# Patient Record
Sex: MALE | Race: WHITE | NOT HISPANIC OR LATINO
[De-identification: names, ages, dates, MRNs, and addresses within clinical notes are randomized per-mention and may not be internally consistent; named-entity substitution may affect disease eponyms.]

---

## 2020-01-13 PROBLEM — Z00.00 ENCOUNTER FOR PREVENTIVE HEALTH EXAMINATION: Status: ACTIVE | Noted: 2020-01-13

## 2020-02-04 ENCOUNTER — APPOINTMENT (OUTPATIENT)
Dept: HEMATOLOGY ONCOLOGY | Facility: CLINIC | Age: 60
End: 2020-02-04
Payer: COMMERCIAL

## 2020-02-04 VITALS
RESPIRATION RATE: 18 BRPM | BODY MASS INDEX: 26.96 KG/M2 | OXYGEN SATURATION: 96 % | HEIGHT: 69 IN | HEART RATE: 93 BPM | DIASTOLIC BLOOD PRESSURE: 76 MMHG | SYSTOLIC BLOOD PRESSURE: 118 MMHG | TEMPERATURE: 98.7 F | WEIGHT: 182 LBS

## 2020-02-04 DIAGNOSIS — F17.200 NICOTINE DEPENDENCE, UNSPECIFIED, UNCOMPLICATED: ICD-10-CM

## 2020-02-04 DIAGNOSIS — Z86.79 PERSONAL HISTORY OF OTHER DISEASES OF THE CIRCULATORY SYSTEM: ICD-10-CM

## 2020-02-04 DIAGNOSIS — Z80.7 FAMILY HISTORY OF OTHER MALIGNANT NEOPLASMS OF LYMPHOID, HEMATOPOIETIC AND RELATED TISSUES: ICD-10-CM

## 2020-02-04 PROCEDURE — 99205 OFFICE O/P NEW HI 60 MIN: CPT

## 2020-02-04 NOTE — REVIEW OF SYSTEMS
[Patient Intake Form Reviewed] : Patient intake form was reviewed [Recent Change In Weight] : ~T recent weight change [Abdominal Pain] : abdominal pain [Joint Pain] : joint pain [Joint Stiffness] : joint stiffness [Anxiety] : anxiety [Depression] : depression [Negative] : Allergic/Immunologic [FreeTextEntry7] : right Sided [FreeTextEntry9] : LBP

## 2020-02-04 NOTE — ASSESSMENT
[FreeTextEntry1] : This is a very pleasant 59-year-old gentleman who has recently been diagnosed with a biopsy-proven pancreatic adenocarcinoma. His CAT scan and MRI findings, which I reviewed by telephone with Dr. Singer of [facility] radiology, are suggestive of metastatic hepatic involvement.  I will therefore obtain a PET scan to see if we can further confirm this as well as if there is additional metastatic disease noted may have not been apparent on the CAT scan. I have requested the pathology department perform MMR/MSI testing as well as send a sample for NGS testing.  If there is not a sufficient sample, he may require an additional biopsy at which point I would favor a liver biopsy to also confirm metastatic disease.  Given these findings, I suspect that systemic chemotherapy would be his primary management option.   Due to his younger age I would favor FOLFIRINOX chemotherapy. However, his current poor performance status may make it somewhat more difficult for him to tolerate.  A reasonable second option may be the combination of Gemzar with Abraxane.   I have discussed genetic counseling with the patient but he wished to hold off on this.  Smoking cessation was strongly urged him and he was provided a plan at today's office visit.  \par \par Thank you very much for allowing me to participate in this gentleman's medical care. Should you have any questions or concerns please do not hesitate to call me directly.

## 2020-02-04 NOTE — PHYSICAL EXAM
[Ambulatory and capable of all self care but unable to carry out any work activities] : Status 2- Ambulatory and capable of all self care but unable to carry out any work activities. Up and about more than 50% of waking hours [Normal] : affect appropriate [de-identified] : Discomfort in the right upper quadrant during palpation

## 2020-02-04 NOTE — HISTORY OF PRESENT ILLNESS
[de-identified] : This is a very pleasant 59-year-old gentleman with the below past medical history who presented to [facility] on 12/21/19 for jaundice. Subsequently he was found to have a pancreatic head mass small liver lesions suggestive of metastatic disease and gallstones on CAT scan. He was found to have an obstructive jaundice with elevated LFTs and bilirubin. He underwent an MRI at the time of admission showing mildly dilated pancreatic duct is in the region of the tail and the body also noted was a 10 x 7 mm a hypoenhancing lesion in the pancreatic body and narrowing of the portal vein. An EUS/ERCP showed a mass in the head of the pancreas with invasion into the portal vein stent was placed and biopsies performed at that time. Pathology from this revealed no malignant cells. His CA 19-9 level at that time was 4986. He underwent a repeat EUS with biopsies on 1/27/2020 that confirmed the presence of malignant cells favoring pancreatic adenocarcinoma. He is now here for further recommendations and treatment planning.   [0 - No Distress] : Distress Level: 0

## 2020-02-24 ENCOUNTER — APPOINTMENT (OUTPATIENT)
Dept: HEMATOLOGY ONCOLOGY | Facility: CLINIC | Age: 60
End: 2020-02-24
Payer: COMMERCIAL

## 2020-02-24 VITALS
SYSTOLIC BLOOD PRESSURE: 128 MMHG | TEMPERATURE: 98.3 F | HEART RATE: 88 BPM | WEIGHT: 179 LBS | HEIGHT: 69 IN | DIASTOLIC BLOOD PRESSURE: 83 MMHG | OXYGEN SATURATION: 97 % | BODY MASS INDEX: 26.51 KG/M2 | RESPIRATION RATE: 20 BRPM

## 2020-02-24 PROCEDURE — 99215 OFFICE O/P EST HI 40 MIN: CPT

## 2020-02-24 RX ORDER — EZETIMIBE 10 MG/1
TABLET ORAL
Refills: 0 | Status: ACTIVE | COMMUNITY

## 2020-02-24 RX ORDER — CLOPIDOGREL 75 MG/1
75 TABLET, FILM COATED ORAL
Refills: 0 | Status: ACTIVE | COMMUNITY

## 2020-02-24 RX ORDER — OXYCODONE 10 MG/1
10 TABLET ORAL
Refills: 0 | Status: ACTIVE | COMMUNITY

## 2020-02-24 RX ORDER — DULOXETINE HYDROCHLORIDE 60 MG/1
60 CAPSULE, DELAYED RELEASE ORAL
Refills: 0 | Status: ACTIVE | COMMUNITY

## 2020-02-24 RX ORDER — KRILL/OM-3/DHA/EPA/PHOSPHO/AST 1000-230MG
CAPSULE ORAL
Refills: 0 | Status: ACTIVE | COMMUNITY

## 2020-02-24 NOTE — ASSESSMENT
[FreeTextEntry1] : This is a very pleasant 59-year-old gentleman who has recently been diagnosed with a biopsy-proven pancreatic adenocarcinoma. His CAT scan and MRI findings, which I reviewed by telephone with Dr. Singer of [facility] radiology, are suggestive of metastatic hepatic involvement.  I obtained a PET scan on 2/21/20 which unfortunately did confirm this was metastatic disease.  I have requested the pathology department perform MMR/MSI testing as well as send a sample for NGS testing.  This revealed that the MMR proteins were intact and that there was a KRAS and TP53 mutation present. At this time, unfortunately, these are not actionable.  Given these findings, systemic chemotherapy would be his primary management option.   Due to his younger age I would favor FOLFIRINOX chemotherapy. However, his current poor performance status may make it somewhat more difficult for him to tolerate.  A reasonable second option may be the combination of Gemzar with Abraxane.   I have discussed genetic counseling with the patient but he wished to hold off on this.  Smoking cessation was strongly urged him and he was provided a plan at today's office visit.   Arrangements will be made for him to have a port placed to start chemotherapy soon as possible.  He was instructed to stop his aspirin and Plavix today for port placement.  Case was discussed with Dr. Colvin who will expedite the process.  I will also again set up a visit with Dr. Renee for palliative care and pain management as he may benefit from a celiac plexus block.  The patient will return in one week for followup and to hopefully initiate chemotherapy.

## 2020-02-24 NOTE — PHYSICAL EXAM
[Ambulatory and capable of all self care but unable to carry out any work activities] : Status 2- Ambulatory and capable of all self care but unable to carry out any work activities. Up and about more than 50% of waking hours [Normal] : affect appropriate [de-identified] : Discomfort in the right upper quadrant during palpation

## 2020-02-24 NOTE — HISTORY OF PRESENT ILLNESS
[0 - No Distress] : Distress Level: 0 [de-identified] : This is a very pleasant 59-year-old gentleman with the below past medical history who presented to [facility] on 12/21/19 for jaundice. Subsequently he was found to have a pancreatic head mass small liver lesions suggestive of metastatic disease and gallstones on CAT scan. He was found to have an obstructive jaundice with elevated LFTs and bilirubin. He underwent an MRI at the time of admission showing mildly dilated pancreatic duct is in the region of the tail and the body also noted was a 10 x 7 mm a hypoenhancing lesion in the pancreatic body and narrowing of the portal vein. An EUS/ERCP showed a mass in the head of the pancreas with invasion into the portal vein stent was placed and biopsies performed at that time. Pathology from this revealed no malignant cells. His CA 19-9 level at that time was 4986. He underwent a repeat EUS with biopsies on 1/27/2020 that confirmed the presence of malignant cells favoring pancreatic adenocarcinoma. He is now here for further recommendations and treatment planning.   [de-identified] : Here today for a follow up visit, pancreatic cancer. He complains of pain to his abdomen that wraps arount to his left side 10/10. Pain is increased after meals and at night inhibiting sleep. Pt will reach out to Dr. Renee who's office has tried to contact him.  Poor appetite, 3 lb weight loss since last visit. Pt cancelled last follow up visit stating that he had not completed ordered scans. He also rescheduled his PET scan to 2/21/20 from the prior scheduled appointment 2 weeks earlier.  Importance of compliance with treatment plan reinforced.

## 2020-02-24 NOTE — REASON FOR VISIT
[Initial Consultation] : an initial consultation [Follow-Up Visit] : a follow-up [FreeTextEntry2] : Pancreatic cancer.

## 2020-02-24 NOTE — REVIEW OF SYSTEMS
[Patient Intake Form Reviewed] : Patient intake form was reviewed [Abdominal Pain] : abdominal pain [Recent Change In Weight] : ~T recent weight change [Anxiety] : anxiety [Fatigue] : fatigue [SOB on Exertion] : shortness of breath during exertion [Negative] : Musculoskeletal [FreeTextEntry9] : LBP wraps around to left side, oxycodone

## 2020-02-27 RX ORDER — ONDANSETRON 8 MG/1
8 TABLET ORAL EVERY 8 HOURS
Qty: 45 | Refills: 0 | Status: ACTIVE | COMMUNITY
Start: 2020-02-27 | End: 1900-01-01

## 2020-02-27 RX ORDER — LIDOCAINE AND PRILOCAINE 25; 25 MG/G; MG/G
2.5-2.5 CREAM TOPICAL
Qty: 2 | Refills: 2 | Status: ACTIVE | COMMUNITY
Start: 2020-02-27 | End: 1900-01-01

## 2020-02-27 RX ORDER — PROCHLORPERAZINE MALEATE 10 MG/1
10 TABLET ORAL EVERY 8 HOURS
Qty: 90 | Refills: 2 | Status: ACTIVE | COMMUNITY
Start: 2020-02-27 | End: 1900-01-01

## 2020-03-02 ENCOUNTER — APPOINTMENT (OUTPATIENT)
Dept: HEMATOLOGY ONCOLOGY | Facility: CLINIC | Age: 60
End: 2020-03-02
Payer: COMMERCIAL

## 2020-03-02 VITALS
HEART RATE: 82 BPM | BODY MASS INDEX: 26.22 KG/M2 | OXYGEN SATURATION: 95 % | HEIGHT: 69 IN | DIASTOLIC BLOOD PRESSURE: 73 MMHG | RESPIRATION RATE: 18 BRPM | TEMPERATURE: 98.3 F | WEIGHT: 177 LBS | SYSTOLIC BLOOD PRESSURE: 109 MMHG

## 2020-03-02 PROCEDURE — 99214 OFFICE O/P EST MOD 30 MIN: CPT

## 2020-03-02 NOTE — HISTORY OF PRESENT ILLNESS
[0 - No Distress] : Distress Level: 0 [de-identified] : This is a very pleasant 59-year-old gentleman with the below past medical history who presented to [facility] on 12/21/19 for jaundice. Subsequently he was found to have a pancreatic head mass small liver lesions suggestive of metastatic disease and gallstones on CAT scan. He was found to have an obstructive jaundice with elevated LFTs and bilirubin. He underwent an MRI at the time of admission showing mildly dilated pancreatic duct is in the region of the tail and the body also noted was a 10 x 7 mm a hypoenhancing lesion in the pancreatic body and narrowing of the portal vein. An EUS/ERCP showed a mass in the head of the pancreas with invasion into the portal vein stent was placed and biopsies performed at that time. Pathology from this revealed no malignant cells. His CA 19-9 level at that time was 4986. He underwent a repeat EUS with biopsies on 1/27/2020 that confirmed the presence of malignant cells favoring pancreatic adenocarcinoma. He is now here for further recommendations and treatment planning.   [de-identified] : Here today for a follow up visit, pancreatic cancer in the company of his wife. Back pain continues. He has yet to schedule an appointment with Dr. Renee. Complains of constipation and gas.

## 2020-03-02 NOTE — PHYSICAL EXAM
[Ambulatory and capable of all self care but unable to carry out any work activities] : Status 2- Ambulatory and capable of all self care but unable to carry out any work activities. Up and about more than 50% of waking hours [Normal] : affect appropriate [de-identified] : Discomfort in the right upper quadrant during palpation

## 2020-03-02 NOTE — REASON FOR VISIT
[Follow-Up Visit] : a follow-up [Initial Consultation] : an initial consultation [FreeTextEntry2] : Pancreatic cancer.

## 2020-03-02 NOTE — REVIEW OF SYSTEMS
[Patient Intake Form Reviewed] : Patient intake form was reviewed [Fatigue] : fatigue [SOB on Exertion] : shortness of breath during exertion [Abdominal Pain] : abdominal pain [Anxiety] : anxiety [Joint Pain] : joint pain [FreeTextEntry7] : constipation, mirilax prn. Pain following meals.  [FreeTextEntry8] : frequency [Negative] : Constitutional [FreeTextEntry9] : LBP wraps around to left side continues

## 2020-03-02 NOTE — ASSESSMENT
[FreeTextEntry1] : This is a very pleasant 59-year-old gentleman who has recently been diagnosed with a biopsy-proven pancreatic adenocarcinoma. His CAT scan and MRI findings, which I reviewed by telephone with Dr. Singer of [facility] radiology, are suggestive of metastatic hepatic involvement.  I obtained a PET scan on 2/21/20 which unfortunately did confirm this was metastatic disease.  I have requested the pathology department perform MMR/MSI testing as well as send a sample for NGS testing.  This revealed that the MMR proteins were intact and that there was a KRAS and TP53 mutation present. At this time, unfortunately, these are not actionable.  Given these findings, systemic chemotherapy would be his primary management option.  There is a first line trial available looking at the combination of Gemzar and Abraxane +/- Zolbetuximab in Claudin 18.2 positive metastatic pancreatic adenocarcinoma available at [facility].  The research coordinator and I have both tried to reach him regarding this trial since 2/25/2020 without a return call.  I have discussed this option with he and his wife and he would like to think about it. I urged him to do this as soon as possible as if he is interested we will likely need a liver biopsy so that additional IHC staining can be performed for this on a metastatic site.  If not, then we will proceed with standard of care systemic chemotherapy. Insurance authorization has been requested. He has also yet to schedule an appointment with Dr. Renee of palliative care, the importance of which is again stress he and his wife. I have offered him assistance in accomplishing this, however, he continues to wish to do this on his own.   Due to his younger age I would favor FOLFIRINOX chemotherapy. However, his current poor performance status may make it somewhat more difficult for him to tolerate.  A reasonable second option may be the combination of Gemzar with Abraxane.   I have discussed genetic counseling with the patient but he wished to hold off on this.  Smoking cessation was strongly urged him and he was provided a plan at today's office visit.  The patient will return in one week for followup and to hopefully initiate chemotherapy or earlier if he decides against protocol treatment.

## 2020-03-05 RX ORDER — PANTOPRAZOLE 20 MG/1
20 TABLET, DELAYED RELEASE ORAL DAILY
Qty: 30 | Refills: 0 | Status: ACTIVE | COMMUNITY
Start: 2020-03-05 | End: 1900-01-01

## 2020-03-09 ENCOUNTER — APPOINTMENT (OUTPATIENT)
Dept: HEMATOLOGY ONCOLOGY | Facility: CLINIC | Age: 60
End: 2020-03-09
Payer: COMMERCIAL

## 2020-03-11 ENCOUNTER — APPOINTMENT (OUTPATIENT)
Dept: HEMATOLOGY ONCOLOGY | Facility: CLINIC | Age: 60
End: 2020-03-11
Payer: COMMERCIAL

## 2020-03-11 VITALS
RESPIRATION RATE: 18 BRPM | HEIGHT: 69 IN | TEMPERATURE: 98.3 F | HEART RATE: 103 BPM | OXYGEN SATURATION: 98 % | BODY MASS INDEX: 25.18 KG/M2 | DIASTOLIC BLOOD PRESSURE: 70 MMHG | WEIGHT: 170 LBS | SYSTOLIC BLOOD PRESSURE: 111 MMHG

## 2020-03-11 DIAGNOSIS — G89.3 NEOPLASM RELATED PAIN (ACUTE) (CHRONIC): ICD-10-CM

## 2020-03-11 DIAGNOSIS — C25.9 MALIGNANT NEOPLASM OF PANCREAS, UNSPECIFIED: ICD-10-CM

## 2020-03-11 PROCEDURE — 99214 OFFICE O/P EST MOD 30 MIN: CPT

## 2020-03-13 ENCOUNTER — APPOINTMENT (OUTPATIENT)
Dept: GERIATRICS | Facility: CLINIC | Age: 60
End: 2020-03-13

## 2020-03-16 NOTE — HISTORY OF PRESENT ILLNESS
[de-identified] : This is a very pleasant 59-year-old gentleman with the below past medical history who presented to [facility] on 12/21/19 for jaundice. Subsequently he was found to have a pancreatic head mass small liver lesions suggestive of metastatic disease and gallstones on CAT scan. He was found to have an obstructive jaundice with elevated LFTs and bilirubin. He underwent an MRI at the time of admission showing mildly dilated pancreatic duct is in the region of the tail and the body also noted was a 10 x 7 mm a hypoenhancing lesion in the pancreatic body and narrowing of the portal vein. An EUS/ERCP showed a mass in the head of the pancreas with invasion into the portal vein stent was placed and biopsies performed at that time. Pathology from this revealed no malignant cells. His CA 19-9 level at that time was 4986. He underwent a repeat EUS with biopsies on 1/27/2020 that confirmed the presence of malignant cells favoring pancreatic adenocarcinoma. He is now here for f/u with labs.   [de-identified] : Here today for a follow up visit, pancreatic cancer. Complaints of severe abdominal pain, poor fluid/food intake, dizziness, painful gas and fatigue. Pain is especially worse after eating. He will see Dr. Renee Friday. 7lb weight loss over 9 days. Diarrhea for 2 days.  [0 - No Distress] : Distress Level: 0

## 2020-03-16 NOTE — ASSESSMENT
[FreeTextEntry1] : This is a very pleasant 59-year-old gentleman who has recently been diagnosed with a biopsy-proven pancreatic adenocarcinoma. His CAT scan and MRI findings, which I reviewed by telephone with Dr. Singer of [facility] radiology, are suggestive of metastatic hepatic involvement.  I obtained a PET scan on 2/21/20 which unfortunately did confirm this was metastatic disease.  I have requested the pathology department perform MMR/MSI testing as well as send a sample for NGS testing.  This revealed that the MMR proteins were intact and that there was a KRAS and TP53 mutation present. At this time, unfortunately, these are not actionable.  Given these findings, systemic chemotherapy would be his primary management option.  There is a first line trial available looking at the combination of Gemzar and Abraxane +/- Zolbetuximab in Claudin 18.2 positive metastatic pancreatic adenocarcinoma available at [facility].  The research coordinator and I have both tried to reach him regarding this trial since 2/25/2020 without a return call.  I have discussed this option with he and his wife and they decided against it.   He has also yet to schedule an appointment with Dr. Renee of palliative care, the importance of which is again stress he and his wife. I have offered him assistance in accomplishing this, however, he continues to wish to do this on his own.   Due to his younger age I favored FOLFIRINOX chemotherapy and this was started on 3/4/20.  I have discussed genetic counseling with the patient but he wished to hold off on this.  Smoking cessation was strongly urged him and he was provided a plan at today's office visit.  The patient will return in one week for followup and to hopefully initiate chemotherapy or earlier if he decides against protocol treatment.

## 2020-03-16 NOTE — PHYSICAL EXAM
[Ambulatory and capable of all self care but unable to carry out any work activities] : Status 2- Ambulatory and capable of all self care but unable to carry out any work activities. Up and about more than 50% of waking hours [Normal] : affect appropriate [de-identified] : Discomfort in the right upper quadrant during palpation

## 2020-03-16 NOTE — REVIEW OF SYSTEMS
[Patient Intake Form Reviewed] : Patient intake form was reviewed [Chills] : chills [Fatigue] : fatigue [Dry Eyes] : no dryness of the eyes [SOB on Exertion] : shortness of breath during exertion [Abdominal Pain] : abdominal pain [Diarrhea] : diarrhea [Joint Pain] : joint pain [Muscle Pain] : muscle pain [Anxiety] : anxiety [Negative] : Allergic/Immunologic [FreeTextEntry4] : blood out of nose when blowing, dry mouth [FreeTextEntry7] : very poor intake and no appetite [FreeTextEntry8] : frequency [FreeTextEntry9] : Severe LBP wraps around to left side continues

## 2020-03-17 ENCOUNTER — APPOINTMENT (OUTPATIENT)
Dept: HEMATOLOGY ONCOLOGY | Facility: CLINIC | Age: 60
End: 2020-03-17

## 2020-03-23 ENCOUNTER — APPOINTMENT (OUTPATIENT)
Dept: HEMATOLOGY ONCOLOGY | Facility: CLINIC | Age: 60
End: 2020-03-23

## 2020-03-27 NOTE — REVIEW OF SYSTEMS
[Diarrhea] : diarrhea [FreeTextEntry4] : dry mouth [FreeTextEntry7] : very poor intake and no appetite [FreeTextEntry8] : frequency [FreeTextEntry9] : LBP wraps around to left side continues

## 2020-03-27 NOTE — ASSESSMENT
[FreeTextEntry1] : This is a very pleasant 59-year-old gentleman who has recently been diagnosed with a biopsy-proven pancreatic adenocarcinoma. His CAT scan and MRI findings, which I reviewed by telephone with Dr. Singer of [facility] radiology, are suggestive of metastatic hepatic involvement.  I obtained a PET scan on 2/21/20 which unfortunately did confirm this was metastatic disease.  I have requested the pathology department perform MMR/MSI testing as well as send a sample for NGS testing.  This revealed that the MMR proteins were intact and that there was a KRAS and TP53 mutation present. At this time, unfortunately, these are not actionable.  Given these findings, systemic chemotherapy would be his primary management option.  There is a first line trial available looking at the combination of Gemzar and Abraxane +/- Zolbetuximab in Claudin 18.2 positive metastatic pancreatic adenocarcinoma available at [facility].  The research coordinator and I have both tried to reach him regarding this trial since 2/25/2020 without a return call.  I have discussed this option with he and his wife and they decided against it.   He has also yet to schedule an appointment with Dr. Renee of palliative care, the importance of which is again stress he and his wife. I have offered him assistance in accomplishing this, however, he continues to wish to do this on his own.   Due to his younger age I favored FOLFIRINOX chemotherapy and this was started on 3/4/20.  I have discussed genetic counseling with the patient but he wished to hold off on this.  Smoking cessation was strongly urged him and he was provided a plan at today's office visit.  He will receive IVF today in the infusion center and loperamide for his diarrhea.  His counts and electrolytes appear to be adequate.  He knows to call with any new or worrisome symptoms.  The patient will return in one week for followup and to hopefully initiate chemotherapy or earlier if he decides against protocol treatment.

## 2020-03-27 NOTE — HISTORY OF PRESENT ILLNESS
[de-identified] : This is a very pleasant 59-year-old gentleman with the below past medical history who presented to [facility] on 12/21/19 for jaundice. Subsequently he was found to have a pancreatic head mass small liver lesions suggestive of metastatic disease and gallstones on CAT scan. He was found to have an obstructive jaundice with elevated LFTs and bilirubin. He underwent an MRI at the time of admission showing mildly dilated pancreatic duct is in the region of the tail and the body also noted was a 10 x 7 mm a hypoenhancing lesion in the pancreatic body and narrowing of the portal vein. An EUS/ERCP showed a mass in the head of the pancreas with invasion into the portal vein stent was placed and biopsies performed at that time. Pathology from this revealed no malignant cells. His CA 19-9 level at that time was 4986. He underwent a repeat EUS with biopsies on 1/27/2020 that confirmed the presence of malignant cells favoring pancreatic adenocarcinoma. He is now here for f/u with labs.   [de-identified] : Here today for a follow up visit, pancreatic cancer. Complaints of severe abdominal pain, poor fluid/food intake, dizziness, painful gas and fatigue. Pain is especially worse after eating. He will see Dr. Renee Friday. 7lb weight loss over 9 days. Diarrhea for 2 days.

## 2020-03-30 ENCOUNTER — APPOINTMENT (OUTPATIENT)
Dept: HEMATOLOGY ONCOLOGY | Facility: CLINIC | Age: 60
End: 2020-03-30